# Patient Record
Sex: MALE | Race: BLACK OR AFRICAN AMERICAN
[De-identification: names, ages, dates, MRNs, and addresses within clinical notes are randomized per-mention and may not be internally consistent; named-entity substitution may affect disease eponyms.]

---

## 2023-03-11 ENCOUNTER — HOSPITAL ENCOUNTER (OUTPATIENT)
Dept: HOSPITAL 76 - DI | Age: 33
Discharge: HOME | End: 2023-03-11
Attending: STUDENT IN AN ORGANIZED HEALTH CARE EDUCATION/TRAINING PROGRAM
Payer: COMMERCIAL

## 2023-03-11 DIAGNOSIS — S63.8X2A: Primary | ICD-10-CM

## 2023-03-13 NOTE — MRI REPORT
PROCEDURE:  HAND WO - LT

 

INDICATIONS:  LEFT FINGERS PAIN

 

TECHNIQUE:  

Noncontrast coronal T1 spin echo and T2 fast spin echo with fat saturation, axial proton density fast
 spin echo and T2 fast spin echo with fat saturation, sagittal T1 spin echo and STIR through the hand
 and fingers.  

 

COMPARISON:  None.

 

FINDINGS:  

Image quality:  Excellent.  

 

Bones:  The bones are normally aligned, without marrow contusions or fractures.  No intra-osseous les
ions.

 

First carpometacarpal joint:  On sagittal images, the dorsal radial ligament and posterior oblique li
gament appear thickened with intrasubstance T2 hyperintense signal.  The intermetacarpal ligament bet
ween the 1st and 2nd metacarpal bases also appears intact.  On the volar aspect, the deep and superfi
cial layers of the anterior oblique ligament appear intact.  

 

Interphalangeal joint(s):  The accessory and proper collateral ligaments appear intact.  The volar pl
ate demonstrates normal morphology.  The extensor central slips appear intact on sagittal images.  

 

Metacarpophalangeal joint(s):  The accessory and proper collateral ligaments appear intact, as well a
s the volar plate and adjacent deep transverse metacarpal ligaments.  The sagittal bands of the exten
sor singh appear normal.  

 

Extensor apparatus:  The central slips insert normally on the middle phalangeal base.  The conjoint a
nd terminal tendons insert normally on the distal phalangeal bases.  More proximal portions of the ex
tensor tendons also appear normal.  

 

Flexor apparatus:  The flexor digitorum superficialis and profundus tendons both appear intact.  All 
annular and cruciform pulleys appear intact, without adjacent soft tissue edema.  

 

Soft tissues:  Visualized muscles demonstrate normal bulk and internal signal.  No intramuscular mass
es identified.  No ganglion cysts.  

 

IMPRESSION:  

1. Sprain/low to moderate grade partial-thickness tear involving dorsal radioulnar ligament and poste
rior oblique ligament of first CMC joint. No full-thickness ligament rupture. Bohler ligaments are in
tact.

2. No marrow edema. No fracture or dislocation. No suspicious intraosseous lesion.

3. Extensor and flexor tendons are within normal limits.

 

Reviewed by: Niall Meza MD on 3/13/2023 11:21 AM PDT

Approved by: Niall Meza MD on 3/13/2023 11:21 AM PDT

 

 

Station ID:  [id number]